# Patient Record
Sex: MALE | Race: WHITE | NOT HISPANIC OR LATINO | URBAN - METROPOLITAN AREA
[De-identification: names, ages, dates, MRNs, and addresses within clinical notes are randomized per-mention and may not be internally consistent; named-entity substitution may affect disease eponyms.]

---

## 2020-07-21 ENCOUNTER — APPOINTMENT (RX ONLY)
Dept: URBAN - METROPOLITAN AREA CLINIC 126 | Facility: CLINIC | Age: 55
Setting detail: DERMATOLOGY
End: 2020-07-21

## 2020-07-21 DIAGNOSIS — L29.89 OTHER PRURITUS: ICD-10-CM

## 2020-07-21 PROBLEM — L29.8 OTHER PRURITUS: Status: ACTIVE | Noted: 2020-07-21

## 2020-07-21 PROCEDURE — ? TREATMENT REGIMEN

## 2020-07-21 PROCEDURE — ? RECOMMENDATIONS

## 2020-07-21 PROCEDURE — ? PRODUCT LINE (SKINCEUTICALS)

## 2020-07-21 PROCEDURE — ? PRODUCT LINE (OFFICE PRODUCTS)

## 2020-07-21 PROCEDURE — ? COUNSELING

## 2020-07-21 PROCEDURE — ? PRESCRIPTION

## 2020-07-21 PROCEDURE — 99202 OFFICE O/P NEW SF 15 MIN: CPT

## 2020-07-21 RX ORDER — DICAPRYLYL CARBONATE/DIMETH
SPRAY, NON-AEROSOL (ML) TOPICAL
Qty: 1 | Refills: 2 | Status: ERX | COMMUNITY
Start: 2020-07-21

## 2020-07-21 RX ADMIN — Medication 1: at 00:00

## 2020-07-21 ASSESSMENT — LOCATION SIMPLE DESCRIPTION DERM
LOCATION SIMPLE: LEFT UPPER ARM
LOCATION SIMPLE: RIGHT UPPER ARM

## 2020-07-21 ASSESSMENT — LOCATION ZONE DERM: LOCATION ZONE: ARM

## 2020-07-21 ASSESSMENT — LOCATION DETAILED DESCRIPTION DERM
LOCATION DETAILED: RIGHT DISTAL POSTERIOR UPPER ARM
LOCATION DETAILED: LEFT DISTAL POSTERIOR UPPER ARM

## 2020-07-21 NOTE — PROCEDURE: PRODUCT LINE (SKINCEUTICALS)
Product 1 Price (In Dollars - Numeric Only, No Special Characters Or $): 0.00
Product 3 Application Directions: Apply to face QAM
Product 8 Units: 0
Name Of Product 6: Resveratrol
Name Of Product 11: LHA toner
Name Of Product 16: Glycolic Cleanser
Name Of Product 1: B5 Hydrating Gel
Name Of Product 18: Phyto corrective gel
Product 15 Application Directions: Apply to face QAM and QPM
Name Of Product 8: Phloretin CF
Name Of Product 13: Retexturing activator
Render Product Pricing In Note: Yes
Name Of Product 3: CE Ferulic
Name Of Product 10: Simple cleanser
Name Of Product 5: LHA Cleanser
Name Of Product 15: Redness Neutralizer
Detail Level: Zone
Name Of Product 17: Skin Ceuticals Retinol 0.3
Name Of Product 7: Triple Lipid
Name Of Product 12: Simply clean
Name Of Product 2: Discoloration defense
Name Of Product 4: Blemish and Age Defense
Name Of Product 14: HA Intensifier
Product 18 Units: 1

## 2020-07-21 NOTE — PROCEDURE: PRODUCT LINE (OFFICE PRODUCTS)
Product 46 Units: 0
Product 32 Price (In Dollars - Numeric Only, No Special Characters Or $): 0.00
Name Of Product 6: Qiana Brennan (hair system)
Product 3 Units: 1
Name Of Product 1: Cami King
Name Of Product 3: Aloe Mich cream
Product 2 Application Directions: Apply to scar QD
Name Of Product 5: Biopelle Stem Cell Activator
Render Product Pricing In Note: No
Name Of Product 2: Silagen with SPF
Allow Plan To Count Towards E/M Coding: Yes
Name Of Product 4: Power of Three
Detail Level: Zone

## 2020-07-21 NOTE — HPI: RASH
How Severe Is Your Rash?: moderate
Is This A New Presentation, Or A Follow-Up?: Rash
DISPLAY PLAN FREE TEXT

## 2025-07-22 ENCOUNTER — APPOINTMENT (OUTPATIENT)
Dept: URBAN - METROPOLITAN AREA CLINIC 126 | Facility: CLINIC | Age: 60
Setting detail: DERMATOLOGY
End: 2025-07-22

## 2025-07-22 DIAGNOSIS — L56.8 OTHER SPECIFIED ACUTE SKIN CHANGES DUE TO ULTRAVIOLET RADIATION: ICD-10-CM | Status: INADEQUATELY CONTROLLED

## 2025-07-22 PROCEDURE — ?

## 2025-07-22 PROCEDURE — ? COUNSELING

## 2025-07-22 PROCEDURE — ? PRESCRIPTION MEDICATION MANAGEMENT

## 2025-07-22 PROCEDURE — ? PRESCRIPTION

## 2025-07-22 RX ORDER — PHARMACY COMPOUNDING ACCESSORY
EACH MISCELLANEOUS
Qty: 1 | Refills: 2 | Status: ERX | COMMUNITY
Start: 2025-07-22

## 2025-07-22 RX ORDER — VALACYCLOVIR HYDROCHLORIDE 500 MG/1
TABLET, FILM COATED ORAL
Qty: 10 | Refills: 0 | Status: ERX | COMMUNITY
Start: 2025-07-22

## 2025-07-22 RX ADMIN — VALACYCLOVIR HYDROCHLORIDE: 500 TABLET, FILM COATED ORAL at 00:00

## 2025-07-22 RX ADMIN — Medication: at 00:00

## 2025-07-22 ASSESSMENT — LOCATION SIMPLE DESCRIPTION DERM: LOCATION SIMPLE: RIGHT LIP

## 2025-07-22 ASSESSMENT — LOCATION ZONE DERM: LOCATION ZONE: LIP

## 2025-07-22 ASSESSMENT — LOCATION DETAILED DESCRIPTION DERM: LOCATION DETAILED: RIGHT INFERIOR VERMILION LIP

## 2025-07-22 NOTE — PROCEDURE: PRESCRIPTION MEDICATION MANAGEMENT
Initiate Treatment: Flurobalm bid x 1-2 weeks
Render In Strict Bullet Format?: No
Detail Level: Zone
Plan: Zinc oxide to lip daily